# Patient Record
Sex: FEMALE
[De-identification: names, ages, dates, MRNs, and addresses within clinical notes are randomized per-mention and may not be internally consistent; named-entity substitution may affect disease eponyms.]

---

## 2023-10-25 ENCOUNTER — HOSPITAL ENCOUNTER (OUTPATIENT)
Dept: HOSPITAL 95 - PCU | Age: 83
Setting detail: OBSERVATION
LOS: 1 days | Discharge: HOME | End: 2023-10-26
Attending: INTERNAL MEDICINE | Admitting: STUDENT IN AN ORGANIZED HEALTH CARE EDUCATION/TRAINING PROGRAM
Payer: MEDICARE

## 2023-10-25 VITALS — SYSTOLIC BLOOD PRESSURE: 164 MMHG | DIASTOLIC BLOOD PRESSURE: 71 MMHG

## 2023-10-25 VITALS — HEIGHT: 63 IN | BODY MASS INDEX: 24.88 KG/M2 | WEIGHT: 140.43 LBS

## 2023-10-25 DIAGNOSIS — I25.10: ICD-10-CM

## 2023-10-25 DIAGNOSIS — I69.354: ICD-10-CM

## 2023-10-25 DIAGNOSIS — N18.30: ICD-10-CM

## 2023-10-25 DIAGNOSIS — R07.9: Primary | ICD-10-CM

## 2023-10-25 DIAGNOSIS — N39.0: ICD-10-CM

## 2023-10-25 DIAGNOSIS — E78.5: ICD-10-CM

## 2023-10-25 DIAGNOSIS — E87.6: ICD-10-CM

## 2023-10-25 DIAGNOSIS — I10: ICD-10-CM

## 2023-10-25 DIAGNOSIS — Z88.5: ICD-10-CM

## 2023-10-25 DIAGNOSIS — Z72.0: ICD-10-CM

## 2023-10-25 DIAGNOSIS — I12.9: ICD-10-CM

## 2023-10-25 PROCEDURE — A9270 NON-COVERED ITEM OR SERVICE: HCPCS

## 2023-10-25 PROCEDURE — G0378 HOSPITAL OBSERVATION PER HR: HCPCS

## 2023-10-26 VITALS — SYSTOLIC BLOOD PRESSURE: 146 MMHG | DIASTOLIC BLOOD PRESSURE: 72 MMHG

## 2023-10-26 VITALS — SYSTOLIC BLOOD PRESSURE: 142 MMHG | DIASTOLIC BLOOD PRESSURE: 59 MMHG

## 2023-10-26 VITALS — DIASTOLIC BLOOD PRESSURE: 78 MMHG | SYSTOLIC BLOOD PRESSURE: 172 MMHG

## 2023-10-26 VITALS — DIASTOLIC BLOOD PRESSURE: 68 MMHG | SYSTOLIC BLOOD PRESSURE: 151 MMHG

## 2023-10-26 LAB
ALBUMIN SERPL BCP-MCNC: 3.2 G/DL (ref 3.4–5)
ALBUMIN SERPL BCP-MCNC: 3.3 G/DL (ref 3.4–5)
ALBUMIN/GLOB SERPL: 0.8 {RATIO} (ref 0.8–1.8)
ALBUMIN/GLOB SERPL: 0.9 {RATIO} (ref 0.8–1.8)
ALT SERPL W P-5'-P-CCNC: 16 U/L (ref 12–78)
ALT SERPL W P-5'-P-CCNC: 20 U/L (ref 12–78)
ANION GAP SERPL CALCULATED.4IONS-SCNC: 2 MMOL/L (ref 6–16)
ANION GAP SERPL CALCULATED.4IONS-SCNC: 7 MMOL/L (ref 6–16)
AST SERPL W P-5'-P-CCNC: 20 U/L (ref 12–37)
AST SERPL W P-5'-P-CCNC: 24 U/L (ref 12–37)
BASOPHILS # BLD AUTO: 0.06 K/MM3 (ref 0–0.23)
BASOPHILS NFR BLD AUTO: 1 % (ref 0–2)
BILIRUB SERPL-MCNC: 0.3 MG/DL (ref 0.1–1)
BILIRUB SERPL-MCNC: 0.4 MG/DL (ref 0.1–1)
BUN SERPL-MCNC: 15 MG/DL (ref 8–24)
BUN SERPL-MCNC: 17 MG/DL (ref 8–24)
CALCIUM SERPL-MCNC: 9 MG/DL (ref 8.5–10.1)
CALCIUM SERPL-MCNC: 9.5 MG/DL (ref 8.5–10.1)
CHLORIDE SERPL-SCNC: 114 MMOL/L (ref 98–108)
CHLORIDE SERPL-SCNC: 115 MMOL/L (ref 98–108)
CO2 SERPL-SCNC: 25 MMOL/L (ref 21–32)
CO2 SERPL-SCNC: 26 MMOL/L (ref 21–32)
CREAT SERPL-MCNC: 1.09 MG/DL (ref 0.4–1)
CREAT SERPL-MCNC: 1.19 MG/DL (ref 0.4–1)
DEPRECATED RDW RBC AUTO: 45.9 FL (ref 35.1–46.3)
EOSINOPHIL # BLD AUTO: 0.34 K/MM3 (ref 0–0.68)
EOSINOPHIL NFR BLD AUTO: 4 % (ref 0–6)
ERYTHROCYTE [DISTWIDTH] IN BLOOD BY AUTOMATED COUNT: 13.5 % (ref 11.7–14.2)
GLOBULIN SER CALC-MCNC: 3.7 G/DL (ref 2.2–4)
GLOBULIN SER CALC-MCNC: 4 G/DL (ref 2.2–4)
GLUCOSE SERPL-MCNC: 122 MG/DL (ref 70–99)
GLUCOSE SERPL-MCNC: 97 MG/DL (ref 70–99)
HCT VFR BLD AUTO: 40.6 % (ref 33–51)
HGB BLD-MCNC: 14 G/DL (ref 11.5–16)
IMM GRANULOCYTES # BLD AUTO: 0.04 K/MM3 (ref 0–0.1)
IMM GRANULOCYTES NFR BLD AUTO: 0 % (ref 0–1)
LYMPHOCYTES # BLD AUTO: 2.5 K/MM3 (ref 0.84–5.2)
LYMPHOCYTES NFR BLD AUTO: 27 % (ref 21–46)
MAGNESIUM SERPL-MCNC: 1.9 MG/DL (ref 1.6–2.4)
MCHC RBC AUTO-ENTMCNC: 34.5 G/DL (ref 31.5–36.5)
MCV RBC AUTO: 93 FL (ref 80–100)
MONOCYTES # BLD AUTO: 0.71 K/MM3 (ref 0.16–1.47)
MONOCYTES NFR BLD AUTO: 8 % (ref 4–13)
NEUTROPHILS # BLD AUTO: 5.74 K/MM3 (ref 1.96–9.15)
NEUTROPHILS NFR BLD AUTO: 61 % (ref 41–73)
NRBC # BLD AUTO: 0 K/MM3 (ref 0–0.02)
NRBC BLD AUTO-RTO: 0 /100 WBC (ref 0–0.2)
PLATELET # BLD AUTO: 214 K/MM3 (ref 150–400)
POTASSIUM SERPL-SCNC: 3.3 MMOL/L (ref 3.5–5.5)
POTASSIUM SERPL-SCNC: 5 MMOL/L (ref 3.5–5.5)
PROT SERPL-MCNC: 6.9 G/DL (ref 6.4–8.2)
PROT SERPL-MCNC: 7.3 G/DL (ref 6.4–8.2)
SODIUM SERPL-SCNC: 143 MMOL/L (ref 136–145)
SODIUM SERPL-SCNC: 146 MMOL/L (ref 136–145)
TSH SERPL DL<=0.005 MIU/L-ACNC: 2.84 UIU/ML (ref 0.36–4.8)

## 2023-10-26 NOTE — NUR
BLOOD PRESSURE NOTED HIGH.  pt had c/o pain during taking blood pressure.
Will recheck after she finished breakfast.

## 2023-10-26 NOTE — NUR
SHIFT SUMMARY
PATIENT ARRIVED TO PCU 3 VIA EMS FROM [facility] AT 2230. SHE IS ALERT AND
ORIENTED X4, BED/CHAIRBOUND AT BASELINE DUE TO A RESIDUAL LEFT SIDED DEFICIT
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 LUNG SOUNDS CLEAR, SPO2 >90% ON ROOM
AIR, DENIES SHORTNESS OF BREATH. VITAL SIGNS STABLE, SINUS RHYTHM ON TELE,
DENIES CHEST PAIN. NO ACUTE ISSUES NOTED OVERNIGHT. WILL CONTINUE TO MONITOR.
CALL LIGHT WITHIN REACH.

## 2023-10-26 NOTE — NUR
Bedside report received from ISRA Raines.  The pt is alert, some orientation
to person, place, and events but forgetful of some details. Per report, pt
lives at an adult foster home in [place].  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
She is sitting up eating breakfast at this time.

## 2023-10-26 NOTE — NUR
Spoke with daughter Kathryn on the phone.  She was updated on the pt's discharge
plan, and was agreeable to it. Said that she was very thankful that there was
nothing wrong either with the patient's heart nor gall bladder.

## 2023-10-26 NOTE — NUR
Pt was readied for discharge.  Belongings, including home medications from the
pharmacy in their paper sack were verified as in her belongings bag.
Discharge instruction packet was also in a yellow folder and notified the
wheelchair transport employee to make sure it was given to the Foster home
staff upon arrival.  PT was transported in wheelchair out of PCU, destination
her foster home residence in [place].